# Patient Record
Sex: MALE | Race: WHITE
[De-identification: names, ages, dates, MRNs, and addresses within clinical notes are randomized per-mention and may not be internally consistent; named-entity substitution may affect disease eponyms.]

---

## 2009-01-09 VITALS — SYSTOLIC BLOOD PRESSURE: 190.5 MMHG | SYSTOLIC BLOOD PRESSURE: 190.5 MMHG

## 2017-04-19 ENCOUNTER — HOSPITAL ENCOUNTER (OUTPATIENT)
Dept: HOSPITAL 19 - COL.VAS | Age: 77
End: 2017-04-19
Attending: INTERNAL MEDICINE
Payer: MEDICARE

## 2017-04-19 DIAGNOSIS — I80.3: ICD-10-CM

## 2017-04-19 DIAGNOSIS — M79.89: Primary | ICD-10-CM

## 2017-04-19 DIAGNOSIS — I82.432: ICD-10-CM

## 2020-05-28 ENCOUNTER — HOSPITAL ENCOUNTER (INPATIENT)
Dept: HOSPITAL 19 - COL.ER | Age: 80
LOS: 2 days | Discharge: HOME | DRG: 65 | End: 2020-05-30
Attending: INTERNAL MEDICINE | Admitting: INTERNAL MEDICINE
Payer: MEDICARE

## 2020-05-28 VITALS — DIASTOLIC BLOOD PRESSURE: 95 MMHG | TEMPERATURE: 98 F | SYSTOLIC BLOOD PRESSURE: 159 MMHG | HEART RATE: 66 BPM

## 2020-05-28 VITALS — DIASTOLIC BLOOD PRESSURE: 94 MMHG | TEMPERATURE: 97.4 F | SYSTOLIC BLOOD PRESSURE: 187 MMHG | HEART RATE: 72 BPM

## 2020-05-28 VITALS — SYSTOLIC BLOOD PRESSURE: 156 MMHG | DIASTOLIC BLOOD PRESSURE: 72 MMHG | TEMPERATURE: 97.9 F | HEART RATE: 67 BPM

## 2020-05-28 VITALS — HEIGHT: 75 IN | BODY MASS INDEX: 29.06 KG/M2 | WEIGHT: 233.69 LBS

## 2020-05-28 DIAGNOSIS — I63.89: Primary | ICD-10-CM

## 2020-05-28 DIAGNOSIS — G81.94: ICD-10-CM

## 2020-05-28 DIAGNOSIS — I16.1: ICD-10-CM

## 2020-05-28 DIAGNOSIS — Z86.718: ICD-10-CM

## 2020-05-28 DIAGNOSIS — R29.705: ICD-10-CM

## 2020-05-28 DIAGNOSIS — Z86.711: ICD-10-CM

## 2020-05-28 LAB
ALBUMIN SERPL-MCNC: 4.5 GM/DL (ref 3.5–5)
ALP SERPL-CCNC: 64 U/L (ref 50–136)
ALT SERPL-CCNC: 24 U/L (ref 4–49)
ANION GAP SERPL CALC-SCNC: 10 MMOL/L (ref 7–16)
AST SERPL-CCNC: 37 U/L (ref 15–37)
BASOPHILS # BLD: 0 10*3/UL (ref 0–0.2)
BASOPHILS NFR BLD AUTO: 0.3 % (ref 0–2)
BILIRUB SERPL-MCNC: 1.6 MG/DL (ref 0–1)
BUN SERPL-MCNC: 22 MG/DL (ref 9–20)
CALCIUM SERPL-MCNC: 9.5 MG/DL (ref 8.4–10.2)
CHLORIDE SERPL-SCNC: 102 MMOL/L (ref 98–107)
CO2 SERPL-SCNC: 25 MMOL/L (ref 22–30)
CREAT SERPL-SCNC: 1.18 UMOL/L (ref 0.66–1.25)
EOSINOPHIL # BLD: 0.1 10*3/UL (ref 0–0.7)
EOSINOPHIL NFR BLD: 1 % (ref 0–4)
ERYTHROCYTE [DISTWIDTH] IN BLOOD BY AUTOMATED COUNT: 11.9 % (ref 11.5–14.5)
GLUCOSE SERPL-MCNC: 97 MG/DL (ref 74–106)
GRANULOCYTES # BLD AUTO: 78.6 % (ref 42.2–75.2)
HCT VFR BLD AUTO: 46.5 % (ref 42–52)
HGB BLD-MCNC: 15.9 G/DL (ref 13.5–18)
INR BLD: 1.1 (ref 0.8–3)
LYMPHOCYTES # BLD: 0.7 10*3/UL (ref 1.2–3.4)
LYMPHOCYTES NFR BLD: 10.3 % (ref 20–51)
MCH RBC QN AUTO: 33 PG (ref 27–31)
MCHC RBC AUTO-ENTMCNC: 34 G/DL (ref 33–37)
MCV RBC AUTO: 97 FL (ref 80–100)
MONOCYTES # BLD: 0.6 10*3/UL (ref 0.1–0.6)
MONOCYTES NFR BLD AUTO: 8.8 % (ref 1.7–9.3)
NEUTROPHILS # BLD: 5.7 10*3/UL (ref 1.4–6.5)
PLATELET # BLD AUTO: 238 K/MM3 (ref 130–400)
PMV BLD AUTO: 9.3 FL (ref 7.4–10.4)
POTASSIUM SERPL-SCNC: 4.1 MMOL/L (ref 3.4–5)
PROT SERPL-MCNC: 8.4 GM/DL (ref 6.4–8.2)
PROTHROMBIN TIME: 12.4 SECONDS (ref 9.7–12.8)
RBC # BLD AUTO: 4.82 M/MM3 (ref 4.2–5.6)
SODIUM SERPL-SCNC: 137 MMOL/L (ref 137–145)
TROPONIN I SERPL-MCNC: 0.01 NG/ML (ref 0–0.04)

## 2020-05-28 PROCEDURE — A9585 GADOBUTROL INJECTION: HCPCS

## 2020-05-28 NOTE — NUR
Received report from LU Kulkarni. Alert and oriented x4. Resting in bed upon entry,
NAD. Ate 100% of dinner. Denies any pain or discomfort at this time. States
ambulated to BR using wall and rail for support, statesno weakness at this
time. Slight drift observed to left arm when raised, equal hand  and
strength. Denies any numbness or tingling. INT to RAC intact, flushed,
dressing CDI. Tele monitor in place. Needs met at this time. Call light within
reach.
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
## 2020-05-28 NOTE — NUR
PHYSICIAN NOTIFIED OF BP /94, DR BACH DID NOT WANT TO TAKE ANY
ACTION AT THIS TIME . PHYSICIAN STATED IT WAS OK FOR PT TO HAVE DINNER TRAY.
NO OTHER NEEDS AT THIS TIME.PT DENIES PAIN OR DISCOMFORT. MRI PERFORMED, SOME
L SIDED WEAKNESS STILL PRESENT.

## 2020-05-29 VITALS — SYSTOLIC BLOOD PRESSURE: 186 MMHG | TEMPERATURE: 97.7 F | DIASTOLIC BLOOD PRESSURE: 93 MMHG | HEART RATE: 69 BPM

## 2020-05-29 VITALS — DIASTOLIC BLOOD PRESSURE: 97 MMHG | TEMPERATURE: 98.5 F | SYSTOLIC BLOOD PRESSURE: 180 MMHG | HEART RATE: 69 BPM

## 2020-05-29 VITALS — TEMPERATURE: 97.5 F | HEART RATE: 66 BPM | DIASTOLIC BLOOD PRESSURE: 105 MMHG | SYSTOLIC BLOOD PRESSURE: 169 MMHG

## 2020-05-29 VITALS — HEART RATE: 86 BPM | SYSTOLIC BLOOD PRESSURE: 167 MMHG | DIASTOLIC BLOOD PRESSURE: 87 MMHG | TEMPERATURE: 97.8 F

## 2020-05-29 VITALS — SYSTOLIC BLOOD PRESSURE: 157 MMHG | HEART RATE: 58 BPM | TEMPERATURE: 97.6 F | DIASTOLIC BLOOD PRESSURE: 88 MMHG

## 2020-05-29 VITALS — DIASTOLIC BLOOD PRESSURE: 95 MMHG | TEMPERATURE: 97.8 F | HEART RATE: 67 BPM | SYSTOLIC BLOOD PRESSURE: 166 MMHG

## 2020-05-29 VITALS — SYSTOLIC BLOOD PRESSURE: 159 MMHG | HEART RATE: 70 BPM | TEMPERATURE: 97.5 F | DIASTOLIC BLOOD PRESSURE: 78 MMHG

## 2020-05-29 LAB
ALBUMIN SERPL-MCNC: 4.1 GM/DL (ref 3.5–5)
ALP SERPL-CCNC: 54 U/L (ref 50–136)
ALT SERPL-CCNC: 20 U/L (ref 4–49)
ANION GAP SERPL CALC-SCNC: 8 MMOL/L (ref 7–16)
AST SERPL-CCNC: 29 U/L (ref 15–37)
BASOPHILS # BLD: 0 10*3/UL (ref 0–0.2)
BASOPHILS NFR BLD AUTO: 0.3 % (ref 0–2)
BILIRUB SERPL-MCNC: 1 MG/DL (ref 0–1)
BUN SERPL-MCNC: 18 MG/DL (ref 9–20)
CALCIUM SERPL-MCNC: 9 MG/DL (ref 8.4–10.2)
CHLORIDE SERPL-SCNC: 102 MMOL/L (ref 98–107)
CHOLEST SPEC-SCNC: 144 MG/DL (ref 120–200)
CHOLEST/HDLC SERPL-SRTO: 4.9
CO2 SERPL-SCNC: 26 MMOL/L (ref 22–30)
CREAT SERPL-SCNC: 1.04 UMOL/L (ref 0.66–1.25)
EOSINOPHIL # BLD: 0.1 10*3/UL (ref 0–0.7)
EOSINOPHIL NFR BLD: 2 % (ref 0–4)
ERYTHROCYTE [DISTWIDTH] IN BLOOD BY AUTOMATED COUNT: 11.8 % (ref 11.5–14.5)
GLUCOSE SERPL-MCNC: 101 MG/DL (ref 74–106)
GRANULOCYTES # BLD AUTO: 78.1 % (ref 42.2–75.2)
HCT VFR BLD AUTO: 44.7 % (ref 42–52)
HDLC SERPL-MCNC: 29 MG/DL
HGB BLD-MCNC: 15.4 G/DL (ref 13.5–18)
LDLC SERPL-MCNC: 98 MG/DL
LYMPHOCYTES # BLD: 0.7 10*3/UL (ref 1.2–3.4)
LYMPHOCYTES NFR BLD: 10.6 % (ref 20–51)
MCH RBC QN AUTO: 33 PG (ref 27–31)
MCHC RBC AUTO-ENTMCNC: 35 G/DL (ref 33–37)
MCV RBC AUTO: 96 FL (ref 80–100)
MONOCYTES # BLD: 0.5 10*3/UL (ref 0.1–0.6)
MONOCYTES NFR BLD AUTO: 8.2 % (ref 1.7–9.3)
NEUTROPHILS # BLD: 5 10*3/UL (ref 1.4–6.5)
PLATELET # BLD AUTO: 196 K/MM3 (ref 130–400)
PMV BLD AUTO: 10.1 FL (ref 7.4–10.4)
POTASSIUM SERPL-SCNC: 4.1 MMOL/L (ref 3.4–5)
PROT SERPL-MCNC: 7.4 GM/DL (ref 6.4–8.2)
RBC # BLD AUTO: 4.66 M/MM3 (ref 4.2–5.6)
SODIUM SERPL-SCNC: 136 MMOL/L (ref 137–145)
TRIGL SERPL-MCNC: 86 MG/DL
TSH SERPL DL<=0.005 MIU/L-ACNC: 2.2 UIU/ML (ref 0.47–4.68)

## 2020-05-29 NOTE — NUR
followed up with patient to discuss options for outpatient
therapy. CHET advised that [facility] does not offer OT. CHET advised that the only
offices that provide OT are [facility] [address] and [facility]. Patient states he would like to have both PT and OT at [facility] on
[address] so he can have everything in one location. SW contacted [facility]
on [address] and faxed referral. Patient told SW he is off work on Fridays
and would like his appointment scheduled on a Friday. CHET scheduled PT/OT eval
for Friday, 06/05/20 @ 900. CHET provided appointment to patient and unit clerk.
CHET will fax discharge orders upon discharge.

## 2020-05-29 NOTE — NUR
CHET met with the patient and his friend Pat to complete initial intake. The
patient gave permission to continue with Pat present. The patient lives
alone in [place]. The patient has a crutch at home and is independent with
ADLs. The patient still works for [employer]. The patient's PCP is
Dr. Zelaya and patient receives medications from [facility]. The patient
does not have advanced directives in the EMR. A form was provided and the
patient states [facility] will assist him in filling it out. PT is recommending
outpatient physical therapy. The patient would like to go to [facility] on [address]. CHET contacted Francisco with [facility] and she
would like some patient information. CHET faxed facesheet and PT notes. At
discharge the script will need to be faxed to [phone]. CHET will continue
to follow.

## 2020-05-29 NOTE — NUR
Received report from LU Vargas. Alert, oriented, independent in room. Denies any
pain or discomfort at this time. Meds administered as ordered. INT to RAC
intact, flushed, dressing CDI. Call light within reach.

## 2020-05-29 NOTE — NUR
Pt awake and alert in restroom upon entry, steady gate observed, no C/O pain
at this time, shift assessments complete, left Pt call light in reach.

## 2020-05-30 VITALS — TEMPERATURE: 97.8 F | HEART RATE: 61 BPM | SYSTOLIC BLOOD PRESSURE: 150 MMHG | DIASTOLIC BLOOD PRESSURE: 83 MMHG

## 2020-05-30 VITALS — HEART RATE: 67 BPM | DIASTOLIC BLOOD PRESSURE: 82 MMHG | TEMPERATURE: 97.3 F | SYSTOLIC BLOOD PRESSURE: 159 MMHG

## 2020-05-30 VITALS — SYSTOLIC BLOOD PRESSURE: 139 MMHG | HEART RATE: 72 BPM | DIASTOLIC BLOOD PRESSURE: 87 MMHG | TEMPERATURE: 97.7 F

## 2020-05-30 LAB
ANION GAP SERPL CALC-SCNC: 10 MMOL/L (ref 7–16)
BASOPHILS # BLD: 0 10*3/UL (ref 0–0.2)
BASOPHILS NFR BLD AUTO: 0.1 % (ref 0–2)
BUN SERPL-MCNC: 17 MG/DL (ref 9–20)
CALCIUM SERPL-MCNC: 8.9 MG/DL (ref 8.4–10.2)
CHLORIDE SERPL-SCNC: 102 MMOL/L (ref 98–107)
CO2 SERPL-SCNC: 25 MMOL/L (ref 22–30)
CREAT SERPL-SCNC: 0.99 UMOL/L (ref 0.66–1.25)
EOSINOPHIL # BLD: 0.1 10*3/UL (ref 0–0.7)
EOSINOPHIL NFR BLD: 2 % (ref 0–4)
ERYTHROCYTE [DISTWIDTH] IN BLOOD BY AUTOMATED COUNT: 11.8 % (ref 11.5–14.5)
GLUCOSE SERPL-MCNC: 101 MG/DL (ref 74–106)
GRANULOCYTES # BLD AUTO: 76.7 % (ref 42.2–75.2)
HCT VFR BLD AUTO: 44.7 % (ref 42–52)
HGB BLD-MCNC: 15.4 G/DL (ref 13.5–18)
LYMPHOCYTES # BLD: 0.9 10*3/UL (ref 1.2–3.4)
LYMPHOCYTES NFR BLD: 12.3 % (ref 20–51)
MCH RBC QN AUTO: 33 PG (ref 27–31)
MCHC RBC AUTO-ENTMCNC: 35 G/DL (ref 33–37)
MCV RBC AUTO: 96 FL (ref 80–100)
MONOCYTES # BLD: 0.6 10*3/UL (ref 0.1–0.6)
MONOCYTES NFR BLD AUTO: 8.3 % (ref 1.7–9.3)
NEUTROPHILS # BLD: 5.4 10*3/UL (ref 1.4–6.5)
PLATELET # BLD AUTO: 216 K/MM3 (ref 130–400)
PMV BLD AUTO: 9.4 FL (ref 7.4–10.4)
POTASSIUM SERPL-SCNC: 4.1 MMOL/L (ref 3.4–5)
RBC # BLD AUTO: 4.64 M/MM3 (ref 4.2–5.6)
SODIUM SERPL-SCNC: 136 MMOL/L (ref 137–145)

## 2020-05-30 NOTE — NUR
Pt awake and alert upon entry, resting in the bed,  no C/O pain at this time.
Shift assessments complete, left Pt call light in reach, bed in lowest
position.

## 2020-05-30 NOTE — NUR
Pt discharged to home, discussed discharge packet with Pt, answered all
questions.  Pt escorted to entrance by PCT, Pt left with friend via private
transportation.

## 2020-05-30 NOTE — NUR
Pt made no complaints during this shift. Needs attended too. Call light within
reach. PRN meds not needed.

## 2021-03-18 ENCOUNTER — HOSPITAL ENCOUNTER (OUTPATIENT)
Dept: HOSPITAL 19 - WSOT | Age: 81
LOS: 60 days | Discharge: STILL ADMITTED OR ADMINISTRATIVE | End: 2021-05-17
Attending: PSYCHIATRY & NEUROLOGY
Payer: MEDICARE

## 2021-03-18 DIAGNOSIS — G56.02: Primary | ICD-10-CM

## 2021-03-18 DIAGNOSIS — G56.22: ICD-10-CM

## 2021-05-25 ENCOUNTER — HOSPITAL ENCOUNTER (EMERGENCY)
Dept: HOSPITAL 19 - COL.ER | Age: 81
Discharge: HOME | End: 2021-05-25
Attending: EMERGENCY MEDICINE
Payer: MEDICARE

## 2021-05-25 VITALS — WEIGHT: 250.45 LBS | BODY MASS INDEX: 31.14 KG/M2 | HEIGHT: 75 IN

## 2021-05-25 VITALS — TEMPERATURE: 98.3 F | DIASTOLIC BLOOD PRESSURE: 100 MMHG | HEART RATE: 77 BPM | SYSTOLIC BLOOD PRESSURE: 181 MMHG

## 2021-05-25 DIAGNOSIS — R06.02: Primary | ICD-10-CM

## 2021-05-25 DIAGNOSIS — Z86.711: ICD-10-CM

## 2021-05-25 DIAGNOSIS — Z91.14: ICD-10-CM

## 2021-05-25 DIAGNOSIS — Z86.718: ICD-10-CM

## 2021-05-25 DIAGNOSIS — R53.83: ICD-10-CM

## 2021-05-25 DIAGNOSIS — Z79.02: ICD-10-CM

## 2021-05-25 LAB
ALBUMIN SERPL-MCNC: 4.6 GM/DL (ref 3.5–5)
ALP SERPL-CCNC: 56 U/L (ref 50–136)
ALT SERPL-CCNC: 22 U/L (ref 4–49)
ANION GAP SERPL CALC-SCNC: 8 MMOL/L (ref 7–16)
AST SERPL-CCNC: 33 U/L (ref 15–37)
BASOPHILS # BLD: 0 10*3/UL (ref 0–0.2)
BASOPHILS NFR BLD AUTO: 0.5 % (ref 0–2)
BILIRUB SERPL-MCNC: 0.5 MG/DL (ref 0–1)
BUN SERPL-MCNC: 18 MG/DL (ref 9–20)
CALCIUM SERPL-MCNC: 9.6 MG/DL (ref 8.4–10.2)
CHLORIDE SERPL-SCNC: 106 MMOL/L (ref 98–107)
CO2 SERPL-SCNC: 26 MMOL/L (ref 22–30)
CREAT SERPL-SCNC: 1.05 UMOL/L (ref 0.66–1.25)
EOSINOPHIL # BLD: 0.3 10*3/UL (ref 0–0.7)
EOSINOPHIL NFR BLD: 3.3 % (ref 0–4)
ERYTHROCYTE [DISTWIDTH] IN BLOOD BY AUTOMATED COUNT: 11.9 % (ref 11.5–14.5)
GLUCOSE SERPL-MCNC: 95 MG/DL (ref 74–106)
GRANULOCYTES # BLD AUTO: 73.3 % (ref 42.2–75.2)
HCT VFR BLD AUTO: 45.1 % (ref 42–52)
HGB BLD-MCNC: 15.3 G/DL (ref 13.5–18)
LYMPHOCYTES # BLD: 1.1 10*3/UL (ref 1.2–3.4)
LYMPHOCYTES NFR BLD: 12.7 % (ref 20–51)
MCH RBC QN AUTO: 33 PG (ref 27–31)
MCHC RBC AUTO-ENTMCNC: 34 G/DL (ref 33–37)
MCV RBC AUTO: 97 FL (ref 80–100)
MONOCYTES # BLD: 0.8 10*3/UL (ref 0.1–0.6)
MONOCYTES NFR BLD AUTO: 9.3 % (ref 1.7–9.3)
NEUTROPHILS # BLD: 6.4 10*3/UL (ref 1.4–6.5)
PLATELET # BLD AUTO: 240 K/MM3 (ref 130–400)
PMV BLD AUTO: 9.5 FL (ref 7.4–10.4)
POTASSIUM SERPL-SCNC: 4.4 MMOL/L (ref 3.4–5)
PROT SERPL-MCNC: 8.2 GM/DL (ref 6.4–8.2)
RBC # BLD AUTO: 4.65 M/MM3 (ref 4.2–5.6)
SODIUM SERPL-SCNC: 140 MMOL/L (ref 137–145)
TROPONIN I SERPL-MCNC: 0.01 NG/ML (ref 0–0.04)

## 2021-06-10 ENCOUNTER — HOSPITAL ENCOUNTER (EMERGENCY)
Dept: HOSPITAL 19 - COL.ER | Age: 81
Discharge: HOME | End: 2021-06-10
Payer: MEDICARE

## 2021-06-10 VITALS — SYSTOLIC BLOOD PRESSURE: 170 MMHG | DIASTOLIC BLOOD PRESSURE: 108 MMHG | HEART RATE: 71 BPM

## 2021-06-10 VITALS — HEIGHT: 75 IN | WEIGHT: 250.45 LBS | BODY MASS INDEX: 31.14 KG/M2

## 2021-06-10 VITALS — TEMPERATURE: 98 F

## 2021-06-10 DIAGNOSIS — Z79.899: ICD-10-CM

## 2021-06-10 DIAGNOSIS — I10: ICD-10-CM

## 2021-06-10 DIAGNOSIS — Z20.822: ICD-10-CM

## 2021-06-10 DIAGNOSIS — Z86.718: ICD-10-CM

## 2021-06-10 DIAGNOSIS — Z79.02: ICD-10-CM

## 2021-06-10 DIAGNOSIS — Z86.711: ICD-10-CM

## 2021-06-10 DIAGNOSIS — R05: Primary | ICD-10-CM

## 2021-06-10 LAB
ALBUMIN SERPL-MCNC: 4.3 GM/DL (ref 3.5–5)
ALP SERPL-CCNC: 52 U/L (ref 50–136)
ALT SERPL-CCNC: 23 U/L (ref 4–49)
ANION GAP SERPL CALC-SCNC: 6 MMOL/L (ref 7–16)
AST SERPL-CCNC: 32 U/L (ref 15–37)
BASOPHILS # BLD: 0 10*3/UL (ref 0–0.2)
BASOPHILS NFR BLD AUTO: 0.4 % (ref 0–2)
BILIRUB SERPL-MCNC: 0.9 MG/DL (ref 0–1)
BUN SERPL-MCNC: 17 MG/DL (ref 9–20)
CALCIUM SERPL-MCNC: 9 MG/DL (ref 8.4–10.2)
CHLORIDE SERPL-SCNC: 108 MMOL/L (ref 98–107)
CO2 SERPL-SCNC: 25 MMOL/L (ref 22–30)
CREAT SERPL-SCNC: 1.18 UMOL/L (ref 0.66–1.25)
EOSINOPHIL # BLD: 0.1 10*3/UL (ref 0–0.7)
EOSINOPHIL NFR BLD: 2 % (ref 0–4)
ERYTHROCYTE [DISTWIDTH] IN BLOOD BY AUTOMATED COUNT: 11.8 % (ref 11.5–14.5)
GLUCOSE SERPL-MCNC: 113 MG/DL (ref 74–106)
GRANULOCYTES # BLD AUTO: 77.1 % (ref 42.2–75.2)
HCT VFR BLD AUTO: 43.9 % (ref 42–52)
HGB BLD-MCNC: 15.1 G/DL (ref 13.5–18)
LYMPHOCYTES # BLD: 0.8 10*3/UL (ref 1.2–3.4)
LYMPHOCYTES NFR BLD: 11.7 % (ref 20–51)
MCH RBC QN AUTO: 33 PG (ref 27–31)
MCHC RBC AUTO-ENTMCNC: 34 G/DL (ref 33–37)
MCV RBC AUTO: 96 FL (ref 80–100)
MONOCYTES # BLD: 0.6 10*3/UL (ref 0.1–0.6)
MONOCYTES NFR BLD AUTO: 8.1 % (ref 1.7–9.3)
NEUTROPHILS # BLD: 5.3 10*3/UL (ref 1.4–6.5)
PLATELET # BLD AUTO: 203 K/MM3 (ref 130–400)
PMV BLD AUTO: 9.4 FL (ref 7.4–10.4)
POTASSIUM SERPL-SCNC: 4 MMOL/L (ref 3.4–5)
PROT SERPL-MCNC: 7.8 GM/DL (ref 6.4–8.2)
RBC # BLD AUTO: 4.57 M/MM3 (ref 4.2–5.6)
SODIUM SERPL-SCNC: 139 MMOL/L (ref 137–145)
TROPONIN I SERPL-MCNC: < 0.012 NG/ML (ref 0–0.04)

## 2021-09-02 ENCOUNTER — HOSPITAL ENCOUNTER (OUTPATIENT)
Dept: HOSPITAL 19 - COL.ER | Age: 81
Setting detail: OBSERVATION
LOS: 1 days | Discharge: HOME | End: 2021-09-03
Attending: INTERNAL MEDICINE | Admitting: INTERNAL MEDICINE
Payer: MEDICARE

## 2021-09-02 VITALS — HEIGHT: 75 IN | BODY MASS INDEX: 31.14 KG/M2 | WEIGHT: 250.45 LBS

## 2021-09-02 VITALS — HEART RATE: 75 BPM | DIASTOLIC BLOOD PRESSURE: 83 MMHG | SYSTOLIC BLOOD PRESSURE: 126 MMHG | TEMPERATURE: 97.5 F

## 2021-09-02 VITALS — TEMPERATURE: 98.5 F | SYSTOLIC BLOOD PRESSURE: 162 MMHG | HEART RATE: 72 BPM | DIASTOLIC BLOOD PRESSURE: 103 MMHG

## 2021-09-02 VITALS — DIASTOLIC BLOOD PRESSURE: 81 MMHG | HEART RATE: 74 BPM | SYSTOLIC BLOOD PRESSURE: 166 MMHG | TEMPERATURE: 97.6 F

## 2021-09-02 DIAGNOSIS — I10: ICD-10-CM

## 2021-09-02 DIAGNOSIS — Z79.899: ICD-10-CM

## 2021-09-02 DIAGNOSIS — E78.5: ICD-10-CM

## 2021-09-02 DIAGNOSIS — Z79.02: ICD-10-CM

## 2021-09-02 DIAGNOSIS — J32.0: ICD-10-CM

## 2021-09-02 DIAGNOSIS — J32.2: ICD-10-CM

## 2021-09-02 DIAGNOSIS — S22.31XA: ICD-10-CM

## 2021-09-02 DIAGNOSIS — I48.91: Primary | ICD-10-CM

## 2021-09-02 DIAGNOSIS — Z86.718: ICD-10-CM

## 2021-09-02 DIAGNOSIS — Z86.73: ICD-10-CM

## 2021-09-02 DIAGNOSIS — M19.90: ICD-10-CM

## 2021-09-02 DIAGNOSIS — Z79.01: ICD-10-CM

## 2021-09-02 LAB
ALBUMIN SERPL-MCNC: 4.4 GM/DL (ref 3.5–5)
ALP SERPL-CCNC: 58 U/L (ref 50–136)
ALT SERPL-CCNC: 19 U/L (ref 4–49)
ANION GAP SERPL CALC-SCNC: 9 MMOL/L (ref 7–16)
APTT PPP: 27.4 SECONDS (ref 26–37)
AST SERPL-CCNC: 22 U/L (ref 15–37)
BASOPHILS # BLD: 0 10*3/UL (ref 0–0.2)
BASOPHILS NFR BLD AUTO: 0.1 % (ref 0–2)
BILIRUB SERPL-MCNC: 0.6 MG/DL (ref 0–1)
BUN SERPL-MCNC: 14 MG/DL (ref 9–20)
CALCIUM SERPL-MCNC: 9.2 MG/DL (ref 8.4–10.2)
CHLORIDE SERPL-SCNC: 105 MMOL/L (ref 98–107)
CO2 SERPL-SCNC: 24 MMOL/L (ref 22–30)
CREAT SERPL-SCNC: 1.12 UMOL/L (ref 0.66–1.25)
DEPRECATED D DIMER PPP IA-ACNC: 406 NG/MLDDU (ref 200–230)
EOSINOPHIL # BLD: 0.1 10*3/UL (ref 0–0.7)
EOSINOPHIL NFR BLD: 0.7 % (ref 0–4)
ERYTHROCYTE [DISTWIDTH] IN BLOOD BY AUTOMATED COUNT: 12.3 % (ref 11.5–14.5)
GLUCOSE SERPL-MCNC: 103 MG/DL (ref 74–106)
GRANULOCYTES # BLD AUTO: 78.9 % (ref 42.2–75.2)
HCT VFR BLD AUTO: 44.4 % (ref 42–52)
HGB BLD-MCNC: 15.5 G/DL (ref 13.5–18)
INR BLD: 1.1 (ref 0.8–3)
LYMPHOCYTES # BLD: 1 10*3/UL (ref 1.2–3.4)
LYMPHOCYTES NFR BLD: 11.1 % (ref 20–51)
MAGNESIUM SERPL-MCNC: 2.2 MG/DL (ref 1.6–2.3)
MCH RBC QN AUTO: 33 PG (ref 27–31)
MCHC RBC AUTO-ENTMCNC: 35 G/DL (ref 33–37)
MCV RBC AUTO: 96 FL (ref 80–100)
MONOCYTES # BLD: 0.8 10*3/UL (ref 0.1–0.6)
MONOCYTES NFR BLD AUTO: 8.6 % (ref 1.7–9.3)
NEUTROPHILS # BLD: 6.9 10*3/UL (ref 1.4–6.5)
PLATELET # BLD AUTO: 229 K/MM3 (ref 130–400)
PMV BLD AUTO: 9.2 FL (ref 7.4–10.4)
POTASSIUM SERPL-SCNC: 4 MMOL/L (ref 3.4–5)
PROT SERPL-MCNC: 7.9 GM/DL (ref 6.4–8.2)
PROTHROMBIN TIME: 11.8 SECONDS (ref 9.7–12.8)
RBC # BLD AUTO: 4.65 M/MM3 (ref 4.2–5.6)
SODIUM SERPL-SCNC: 138 MMOL/L (ref 137–145)
TROPONIN I SERPL-MCNC: 0.01 NG/ML (ref 0–0.04)
TSH SERPL DL<=0.005 MIU/L-ACNC: 1.1 UIU/ML (ref 0.35–4.94)

## 2021-09-02 PROCEDURE — G0378 HOSPITAL OBSERVATION PER HR: HCPCS

## 2021-09-02 NOTE — NUR
Patient has been stable in the evening. No chest pain, dizzinees or pain.
Shift report given to night nurse.

## 2021-09-02 NOTE — NUR
Patient alert and oriented. Patient denies chest pain, palpitation, SOB, N/V,
or diarrhea. Patient scheduled for BLOSSOM/Cardioversion tomorrow. NPO maintained
from midnight. Call light within reach. Will continue to monitor.

## 2021-09-02 NOTE — NUR
Patient is lying in bed alert and oriented, no complains of pain, nausea,
vomiting, dizziness. His blood pressure is high and diastolic over 100.
Continue monitoring

## 2021-09-03 VITALS — TEMPERATURE: 97.6 F | DIASTOLIC BLOOD PRESSURE: 87 MMHG | HEART RATE: 79 BPM | SYSTOLIC BLOOD PRESSURE: 134 MMHG

## 2021-09-03 VITALS — SYSTOLIC BLOOD PRESSURE: 158 MMHG | DIASTOLIC BLOOD PRESSURE: 98 MMHG | HEART RATE: 85 BPM | TEMPERATURE: 98.3 F

## 2021-09-03 VITALS — DIASTOLIC BLOOD PRESSURE: 93 MMHG | SYSTOLIC BLOOD PRESSURE: 147 MMHG | HEART RATE: 104 BPM

## 2021-09-03 VITALS — SYSTOLIC BLOOD PRESSURE: 145 MMHG | HEART RATE: 77 BPM | TEMPERATURE: 98.2 F | DIASTOLIC BLOOD PRESSURE: 96 MMHG

## 2021-09-03 VITALS — HEART RATE: 106 BPM | SYSTOLIC BLOOD PRESSURE: 143 MMHG | DIASTOLIC BLOOD PRESSURE: 95 MMHG

## 2021-09-03 VITALS — HEART RATE: 80 BPM | SYSTOLIC BLOOD PRESSURE: 149 MMHG | DIASTOLIC BLOOD PRESSURE: 94 MMHG

## 2021-09-03 VITALS — SYSTOLIC BLOOD PRESSURE: 136 MMHG | DIASTOLIC BLOOD PRESSURE: 96 MMHG | TEMPERATURE: 98.3 F | HEART RATE: 64 BPM

## 2021-09-03 VITALS — DIASTOLIC BLOOD PRESSURE: 86 MMHG | SYSTOLIC BLOOD PRESSURE: 142 MMHG | HEART RATE: 99 BPM

## 2021-09-03 VITALS — DIASTOLIC BLOOD PRESSURE: 90 MMHG | HEART RATE: 104 BPM | SYSTOLIC BLOOD PRESSURE: 154 MMHG

## 2021-09-03 VITALS — HEART RATE: 110 BPM | SYSTOLIC BLOOD PRESSURE: 157 MMHG | DIASTOLIC BLOOD PRESSURE: 100 MMHG

## 2021-09-03 LAB
ANION GAP SERPL CALC-SCNC: 9 MMOL/L (ref 7–16)
BASOPHILS # BLD: 0 10*3/UL (ref 0–0.2)
BASOPHILS NFR BLD AUTO: 0.4 % (ref 0–2)
BUN SERPL-MCNC: 13 MG/DL (ref 9–20)
CALCIUM SERPL-MCNC: 8.8 MG/DL (ref 8.4–10.2)
CHLORIDE SERPL-SCNC: 106 MMOL/L (ref 98–107)
CO2 SERPL-SCNC: 24 MMOL/L (ref 22–30)
CREAT SERPL-SCNC: 1.06 UMOL/L (ref 0.66–1.25)
EOSINOPHIL # BLD: 0.1 10*3/UL (ref 0–0.7)
EOSINOPHIL NFR BLD: 2.1 % (ref 0–4)
ERYTHROCYTE [DISTWIDTH] IN BLOOD BY AUTOMATED COUNT: 12.5 % (ref 11.5–14.5)
GLUCOSE SERPL-MCNC: 97 MG/DL (ref 74–106)
GRANULOCYTES # BLD AUTO: 74.3 % (ref 42.2–75.2)
HCT VFR BLD AUTO: 44.7 % (ref 42–52)
HGB BLD-MCNC: 15.2 G/DL (ref 13.5–18)
LYMPHOCYTES # BLD: 0.9 10*3/UL (ref 1.2–3.4)
LYMPHOCYTES NFR BLD: 13.9 % (ref 20–51)
MCH RBC QN AUTO: 33 PG (ref 27–31)
MCHC RBC AUTO-ENTMCNC: 34 G/DL (ref 33–37)
MCV RBC AUTO: 98 FL (ref 80–100)
MONOCYTES # BLD: 0.6 10*3/UL (ref 0.1–0.6)
MONOCYTES NFR BLD AUTO: 8.7 % (ref 1.7–9.3)
NEUTROPHILS # BLD: 5 10*3/UL (ref 1.4–6.5)
PLATELET # BLD AUTO: 218 K/MM3 (ref 130–400)
PMV BLD AUTO: 10 FL (ref 7.4–10.4)
POTASSIUM SERPL-SCNC: 4 MMOL/L (ref 3.4–5)
RBC # BLD AUTO: 4.55 M/MM3 (ref 4.2–5.6)
SODIUM SERPL-SCNC: 138 MMOL/L (ref 137–145)

## 2021-09-03 NOTE — NUR
12.5mg Metoprolol administered PO at 0848 by student nurse, CORRY Soliman. Nursing
student verified order with primary nurse prior to administration.

## 2021-09-03 NOTE — NUR
CHET met with the patient to discuss discharge plan. The patient lives in
[place] with his girlfriend, Cara Mathews (ph#[phone]). He reports
independence with ADLs and does not have any DME. The patient's PCP was Dr. Brando Zelaya. Dr. Zelaya has retired. The patient reports that he has sent
his information to Dr. Payne to see if he will accept him. He states that he is
just waiting to hear back from Dr. Payne. He receives his medications from
[facility] and he reports no difficulties obtaining his meds. The patient
does not have a DPOA-HC, but he was interested in obtaining a form. CHET
provided. He states that he is not , does not have any children, and
his parents are . He states that he at eight siblings. SW informed him
that his siblings would be his next of kin. The patient verbalized
understanding. The patient plans to return home with his girlfriend upon
discharge. OT recommends home health. CHET discussed this with the patient and
it's benefit's. The patient declined home health and states that he does not
feel like his needs it at this time. No additional needs at this time.
 
*Discharge plan: home with girlfriend*

## 2021-09-03 NOTE — NUR
Primary nurse was assisted with [id number] patient care by [facility] ADN student Junior Soliman and [facility] instructor Madai Sarkar MSN, RN

## 2021-09-03 NOTE — NUR
Discharge instructions discussed w/pt and all questions answered. Pt pulled
out right AC IV prior to this RN entering room. Tip intact and site w/o s/s
complication. Pt escorted out via wheelchair w/ all belongings.

## 2021-09-03 NOTE — NUR
Patient slept well over the night. VS stable. Consent signed for
BLOSSOM/Cardioversion for today. Call light in reach. Fall precaution maintained.

## 2021-09-03 NOTE — NUR
Nursing student, Kiki Soliman, to coordinate nursing care with primary nurse.
Student will administer medications, complete assessments, and assist with
ADLs from 0700 to 1400.

## 2021-12-15 ENCOUNTER — HOSPITAL ENCOUNTER (EMERGENCY)
Dept: HOSPITAL 19 - COL.ER | Age: 81
Discharge: HOME | End: 2021-12-15
Attending: EMERGENCY MEDICINE
Payer: MEDICARE

## 2021-12-15 VITALS — SYSTOLIC BLOOD PRESSURE: 186 MMHG | HEART RATE: 90 BPM | DIASTOLIC BLOOD PRESSURE: 111 MMHG

## 2021-12-15 VITALS — WEIGHT: 235.45 LBS | BODY MASS INDEX: 29.28 KG/M2 | HEIGHT: 75 IN

## 2021-12-15 VITALS — TEMPERATURE: 98 F

## 2021-12-15 DIAGNOSIS — R46.89: Primary | ICD-10-CM

## 2021-12-15 DIAGNOSIS — Z79.899: ICD-10-CM

## 2021-12-15 DIAGNOSIS — Z86.711: ICD-10-CM

## 2021-12-15 DIAGNOSIS — I48.91: ICD-10-CM

## 2021-12-15 DIAGNOSIS — I10: ICD-10-CM

## 2021-12-15 DIAGNOSIS — Z86.718: ICD-10-CM

## 2021-12-15 DIAGNOSIS — Z79.01: ICD-10-CM

## 2021-12-15 LAB
ALBUMIN SERPL-MCNC: 4.4 GM/DL (ref 3.4–4.8)
ALP SERPL-CCNC: 51 U/L (ref 40–150)
ALT SERPL-CCNC: 18 U/L (ref 0–55)
ANION GAP SERPL CALC-SCNC: 12 MMOL/L (ref 7–16)
AST SERPL-CCNC: 17 U/L (ref 5–34)
BASOPHILS # BLD: 0 K/MM3 (ref 0–0.2)
BASOPHILS NFR BLD AUTO: 0.3 % (ref 0–2)
BILIRUB SERPL-MCNC: 0.7 MG/DL (ref 0.2–1.2)
BUN SERPL-MCNC: 18 MG/DL (ref 8–26)
CALCIUM SERPL-MCNC: 9.4 MG/DL (ref 8.4–10.2)
CHLORIDE SERPL-SCNC: 104 MMOL/L (ref 98–107)
CO2 SERPL-SCNC: 22 MMOL/L (ref 23–31)
CREAT SERPL-SCNC: 1.29 MG/DL (ref 0.72–1.25)
EOSINOPHIL # BLD: 0.1 K/MM3 (ref 0–0.7)
EOSINOPHIL NFR BLD: 0.4 % (ref 0–4)
ERYTHROCYTE [DISTWIDTH] IN BLOOD BY AUTOMATED COUNT: 11.9 % (ref 11.5–14.5)
GLUCOSE SERPL-MCNC: 127 MG/DL (ref 70–99)
GRANULOCYTES # BLD AUTO: 86.9 % (ref 42.2–75.2)
HCT VFR BLD AUTO: 44.2 % (ref 42–52)
HGB BLD-MCNC: 15.6 G/DL (ref 13.5–18)
LIPASE SERPL-CCNC: 39 U/L (ref 8–78)
LYMPHOCYTES # BLD: 0.6 K/MM3 (ref 1.2–3.4)
LYMPHOCYTES NFR BLD: 4.9 % (ref 20–51)
MCH RBC QN AUTO: 33 PG (ref 27–31)
MCHC RBC AUTO-ENTMCNC: 35 G/DL (ref 33–37)
MCV RBC AUTO: 94 FL (ref 80–100)
MONOCYTES # BLD: 0.8 K/MM3 (ref 0.1–0.6)
MONOCYTES NFR BLD AUTO: 6.6 % (ref 1.7–9.3)
NEUTROPHILS # BLD: 10 K/MM3 (ref 1.4–6.5)
PLATELET # BLD AUTO: 236 K/MM3 (ref 130–400)
PMV BLD AUTO: 9.2 FL (ref 7.4–10.4)
POTASSIUM SERPL-SCNC: 4 MMOL/L (ref 3.5–4.5)
PROT SERPL-MCNC: 7.7 GM/DL (ref 6.2–8.1)
RBC # BLD AUTO: 4.72 M/MM3 (ref 4.2–5.6)
SODIUM SERPL-SCNC: 138 MMOL/L (ref 136–145)
TROPONIN I SERPL-MCNC: 0.02 NG/ML (ref 0–0.03)
TSH SERPL DL<=0.005 MIU/L-ACNC: 1.41 UIU/ML (ref 0.35–4.94)

## 2022-01-19 ENCOUNTER — HOSPITAL ENCOUNTER (EMERGENCY)
Dept: HOSPITAL 19 - COL.ER | Age: 82
Discharge: HOME | End: 2022-01-19
Attending: EMERGENCY MEDICINE
Payer: MEDICARE

## 2022-01-19 VITALS — DIASTOLIC BLOOD PRESSURE: 91 MMHG | SYSTOLIC BLOOD PRESSURE: 165 MMHG | HEART RATE: 70 BPM

## 2022-01-19 VITALS — HEIGHT: 75 IN | WEIGHT: 230.38 LBS | BODY MASS INDEX: 28.65 KG/M2

## 2022-01-19 DIAGNOSIS — I10: ICD-10-CM

## 2022-01-19 DIAGNOSIS — T44.1X5A: ICD-10-CM

## 2022-01-19 DIAGNOSIS — Z86.718: ICD-10-CM

## 2022-01-19 DIAGNOSIS — Z79.899: ICD-10-CM

## 2022-01-19 DIAGNOSIS — Z79.01: ICD-10-CM

## 2022-01-19 DIAGNOSIS — R11.10: Primary | ICD-10-CM
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
## 2022-03-24 ENCOUNTER — HOSPITAL ENCOUNTER (EMERGENCY)
Dept: HOSPITAL 19 - COL.ER | Age: 82
Discharge: HOME | End: 2022-03-24
Attending: FAMILY MEDICINE
Payer: MEDICARE

## 2022-03-24 VITALS — TEMPERATURE: 98.2 F

## 2022-03-24 VITALS — HEART RATE: 73 BPM | DIASTOLIC BLOOD PRESSURE: 81 MMHG | SYSTOLIC BLOOD PRESSURE: 171 MMHG

## 2022-03-24 VITALS — WEIGHT: 216.49 LBS | HEIGHT: 75 IN | BODY MASS INDEX: 26.92 KG/M2

## 2022-03-24 DIAGNOSIS — L97.529: Primary | ICD-10-CM

## 2022-03-24 DIAGNOSIS — L97.519: ICD-10-CM

## 2022-06-23 ENCOUNTER — HOSPITAL ENCOUNTER (EMERGENCY)
Dept: HOSPITAL 19 - COL.ER | Age: 82
Discharge: HOME | End: 2022-06-23
Attending: FAMILY MEDICINE
Payer: COMMERCIAL

## 2022-06-23 VITALS — HEIGHT: 75 IN | BODY MASS INDEX: 26.18 KG/M2 | WEIGHT: 210.54 LBS

## 2022-06-23 VITALS — SYSTOLIC BLOOD PRESSURE: 159 MMHG | DIASTOLIC BLOOD PRESSURE: 102 MMHG | HEART RATE: 71 BPM

## 2022-06-23 VITALS — TEMPERATURE: 98.2 F
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
## 2022-06-23 DIAGNOSIS — Z23: ICD-10-CM

## 2022-06-23 DIAGNOSIS — S01.81XA: Primary | ICD-10-CM

## 2022-06-23 DIAGNOSIS — Z28.310: ICD-10-CM

## 2022-09-19 ENCOUNTER — HOSPITAL ENCOUNTER (EMERGENCY)
Dept: HOSPITAL 19 - COL.ER | Age: 82
LOS: 1 days | Discharge: HOME | End: 2022-09-20
Payer: MEDICARE

## 2022-09-19 ENCOUNTER — HOSPITAL ENCOUNTER (EMERGENCY)
Dept: HOSPITAL 19 - COL.ER | Age: 82
Discharge: HOME | End: 2022-09-19
Payer: MEDICARE

## 2022-09-19 VITALS — HEIGHT: 75 IN | WEIGHT: 250.45 LBS | BODY MASS INDEX: 31.14 KG/M2

## 2022-09-19 VITALS — HEART RATE: 82 BPM | DIASTOLIC BLOOD PRESSURE: 120 MMHG | SYSTOLIC BLOOD PRESSURE: 177 MMHG

## 2022-09-19 VITALS — WEIGHT: 240.52 LBS | BODY MASS INDEX: 29.91 KG/M2 | HEIGHT: 75 IN

## 2022-09-19 VITALS — TEMPERATURE: 98.9 F

## 2022-09-19 DIAGNOSIS — Z20.822: ICD-10-CM

## 2022-09-19 DIAGNOSIS — I10: Primary | ICD-10-CM

## 2022-09-19 DIAGNOSIS — F03.90: ICD-10-CM

## 2022-09-19 DIAGNOSIS — N39.0: Primary | ICD-10-CM

## 2022-09-19 DIAGNOSIS — Z28.311: ICD-10-CM

## 2022-09-20 ENCOUNTER — HOSPITAL ENCOUNTER (EMERGENCY)
Dept: HOSPITAL 19 - COL.ER | Age: 82
LOS: 1 days | Discharge: NURSING, REHAB OR LONG-TERM CARE | End: 2022-09-21
Payer: MEDICARE

## 2022-09-20 VITALS — SYSTOLIC BLOOD PRESSURE: 139 MMHG | DIASTOLIC BLOOD PRESSURE: 79 MMHG | HEART RATE: 72 BPM

## 2022-09-20 VITALS — WEIGHT: 250.45 LBS | BODY MASS INDEX: 31.14 KG/M2 | HEIGHT: 75 IN

## 2022-09-20 DIAGNOSIS — F03.90: Primary | ICD-10-CM

## 2022-09-20 DIAGNOSIS — N39.0: ICD-10-CM

## 2022-09-20 LAB
ALBUMIN SERPL-MCNC: 3.7 GM/DL (ref 3.4–4.8)
ALP SERPL-CCNC: 64 U/L (ref 40–150)
ALT SERPL-CCNC: 12 U/L (ref 0–55)
ANION GAP SERPL CALC-SCNC: 10 MMOL/L (ref 7–16)
AST SERPL-CCNC: 16 U/L (ref 5–34)
BASOPHILS # BLD: 0 K/MM3 (ref 0–0.2)
BASOPHILS NFR BLD AUTO: 0.3 % (ref 0–2)
BILIRUB SERPL-MCNC: 1.4 MG/DL (ref 0.2–1.2)
BUN SERPL-MCNC: 18 MG/DL (ref 8–26)
CALCIUM SERPL-MCNC: 8.9 MG/DL (ref 8.4–10.2)
CHLORIDE SERPL-SCNC: 103 MMOL/L (ref 98–107)
CO2 SERPL-SCNC: 23 MMOL/L (ref 23–31)
CREAT SERPL-SCNC: 1.19 MG/DL (ref 0.72–1.25)
EOSINOPHIL # BLD: 0.1 K/MM3 (ref 0–0.7)
EOSINOPHIL NFR BLD: 0.6 % (ref 0–4)
ERYTHROCYTE [DISTWIDTH] IN BLOOD BY AUTOMATED COUNT: 11.8 % (ref 11.5–14.5)
GLUCOSE SERPL-MCNC: 100 MG/DL (ref 70–99)
GRANULOCYTES # BLD AUTO: 76.4 % (ref 42.2–75.2)
HCT VFR BLD AUTO: 41.7 % (ref 42–52)
HGB BLD-MCNC: 14.6 G/DL (ref 13.5–18)
LYMPHOCYTES # BLD: 1 K/MM3 (ref 1.2–3.4)
LYMPHOCYTES NFR BLD: 10.8 % (ref 20–51)
MCH RBC QN AUTO: 34 PG (ref 27–31)
MCHC RBC AUTO-ENTMCNC: 35 G/DL (ref 33–37)
MCV RBC AUTO: 96 FL (ref 80–100)
MONOCYTES # BLD: 1 K/MM3 (ref 0.1–0.6)
MONOCYTES NFR BLD AUTO: 10.9 % (ref 1.7–9.3)
NEUTROPHILS # BLD: 6.8 K/MM3 (ref 1.4–6.5)
PH UR STRIP.AUTO: 7 [PH] (ref 5–8.5)
PLATELET # BLD AUTO: 193 K/MM3 (ref 130–400)
PMV BLD AUTO: 9.2 FL (ref 7.4–10.4)
POTASSIUM SERPL-SCNC: 4.5 MMOL/L (ref 3.5–4.5)
PROT SERPL-MCNC: 7 GM/DL (ref 6.2–8.1)
RBC # BLD AUTO: 4.36 M/MM3 (ref 4.2–5.6)
RBC # UR STRIP.AUTO: (no result) /UL
RBC # UR: (no result) /HPF (ref 0–2)
SODIUM SERPL-SCNC: 136 MMOL/L (ref 136–145)
SP GR UR STRIP.AUTO: 1.01 (ref 1–1.03)
TROPONIN I SERPL-MCNC: 0.03 NG/ML (ref 0–0.03)
TSH SERPL DL<=0.005 MIU/L-ACNC: 1.39 UIU/ML (ref 0.35–4.94)
URN COLLECT METHOD CLASS: (no result)
UROBILINOGEN UR STRIP.AUTO-MCNC: 0.2 E.U/DL (ref 0.2–1)

## 2022-09-20 NOTE — NUR
was contacted by Maddy Stringer #[phone], patient's Bond
Superviser and was in communication throughout this date securing the
following information.  Miranda Chacon, HCAP  from the [facility]
states that with a completed durable power of  for health care she can
place patient in "emergency housing" this date in [place], [place].  Miranda
states that while patient is in their housing, they will have him evaluated by
a Neuropsychologist for determinants of dementia and functioning level.  From
that evaluation, they will pursue placement in an appropriate level of long
term care.  Patient refused to sign a durable power of  for health
care naming his two sisters Angelina Burdick and Kirstin Kennedy (they both agreed to
this role), rather states he already has a dpoa and that is Da Adamsopp.  Worker
contacted Da's office and spoke with his partner, Warner Merritt and confirmed
that there is no dpoa for [facility] and that this practice will not serve in
this capacity as patient has previously hired Mao Barber to represent him.
Maddy stated she can force patient, under probation terms, to enter into
this [facility] emergency housing, however, states that patient must be agreeable to
completing a dpoa for health care.   also confirmed that the [place]
[facility] in [place], [place] has openings in their IL, [place], and full care
units.  Kirstin states that she has almost completed patient's application and
will reach out to Diana at this facility (this  shared contact
information with Kirstin).  Patient must have a  to accompany the
application as this is proof that patient served in the .  Social
worker met with patient alone and also with patient's nurse, Blaire and
director of ED, Christine.  Patient verbalizes his wishes, clearly, that he
wants to leave and go to the bank and then to his fiance's home in [place].
Patient states he will not complete a dpoa appointing his sister's and will
not agree to stay at the emergency housing at this time.  Worker collaborated
with Linette, Risk Management, regarding discharge planning and appropriate
discharge from the ED.  [facility] did not screen as patient was
not suicidal or homicidal.  Patient was able to verbalize his wishes clearly.
Worker arranged for patient's sister Kirstin to  prescriptions at
[facility] and take them to the courthouse.  Worker arranged for
[facility]'s transportation to [facility], per his request and then to a
mandated appointment with Maddy at the court house.

## 2022-09-20 NOTE — NUR
spoke with Kirstin Lundberg #[phone], patient's sister and
discussed patient's current status since being release from the [facility] on 9/19/22.  Kirstin states that no family member will allow patient to
live with them.  She and her sister, Angelina are willing to be durable power of
's for health and/or guardian/conservator for the patient.  Patient is
awaiting [facility] screening and that will help us determine which
of the above routes to pursue.  Worker advised that she is very familiar with
the patient and has had previous converstations with the retirement leaders and
other community providers (Rose at [facility], [facility] police
officers, and [facility] ambulance leaders regarding the fact that there is
no homeless shelter in [place] and no nursing home will accept patient due
to his incarceration and trouble with violent outbursts and weapons issues.
Kirstin states that Angelina has access and monitors patient's bank accounts,
where he does have money.  Kirstin states he does not have $400,000.00 as
patient states he has.  Worker reached out to [facility]'s office
to see if they have had involvement with any type of placement attempts as
Kirstin thought they did.  Kirstin was thankful to be made aware of the
resources and lack there of.  Worker gave Kirstin the phone number to the
[facility] clinic and encouraged her to call them today to inquire about
benefits.

## 2022-09-21 VITALS — HEART RATE: 88 BPM | DIASTOLIC BLOOD PRESSURE: 101 MMHG | SYSTOLIC BLOOD PRESSURE: 169 MMHG | TEMPERATURE: 98.4 F

## 2022-09-21 NOTE — NUR
Patient presented to the emergency room a third time after being released from
[facility] on 9/19/22.  Worker collaborated with Capt Jonas and Lt Parekh with the
[facility] police department regarding the following documentation.  Worker
spoke with Dr Broderick and requested that she screen patient for capacity to
make his decisions.  This evaluation was completed and patient was found to
not have capacity for his own decision making.  Worker contacted Miranda
[facility].  Miranda stated she can accept patient to the
[facility] and arrangements made for secure transportation today.  Social
worker completed a Level I CARE assessment and will [facility] with request
for a Level II CARE to be completed.  Worker spoke with patient's sister,
Kirstin and advised of the above information.  Kirstin will secure an 
to pursue Guardianship/conservatorship that patient's sister, Angelina and Kirstin
agree to.  Worker faxed clinical information to Miranda and the VA.
Miranda is aware that Care was completed and that sister is pursing
guardianship and is agreeable for patient's placement the the psychiatric
nursing home ([facility]) this date. Worker met with patient and obtained
signatures for the Care assessment.

## 2022-09-23 NOTE — NUR
Patient's sisters were unable to secure an  to complete
guardianship/conservatorship so this  gave referral to Khadar Wall, [facility] .  Khadar stated that he would file this motion today,
and may not be able to secure completion until Monday.